# Patient Record
Sex: MALE | Race: BLACK OR AFRICAN AMERICAN | NOT HISPANIC OR LATINO | Employment: OTHER | URBAN - METROPOLITAN AREA
[De-identification: names, ages, dates, MRNs, and addresses within clinical notes are randomized per-mention and may not be internally consistent; named-entity substitution may affect disease eponyms.]

---

## 2019-07-08 LAB
ALBUMIN: 3.6 G/DL (ref 3.4–5)
ALP ISOS SERPL LEV INH-CCNC: 56 U/L (ref 46–116)
ALT (SGPT): 21 U/L (ref 16–63)
ANION GAP SERPL CALC-SCNC: 5 MMOL/L (ref 4–14)
AST SERPL-CCNC: 18 U/L (ref 15–37)
BILIRUB SERPL-MCNC: 0.7 MG/DL (ref 0.2–1)
BUN SERPL-MCNC: 15 MG/DL (ref 7–18)
BUN/CREAT RATIO: 17
CALCIUM SERPL-MCNC: 9.4 MG/DL (ref 8.5–10.1)
CHLORIDE SERPL-SCNC: 106 MMOL/L (ref 98–107)
CO2 SERPL-SCNC: 31 MMOL/L (ref 21–32)
CREAT SERPL-MCNC: 0.88 MG/DL (ref 0.7–1.3)
ERYTHROCYTE [DISTWIDTH] IN BLOOD BY AUTOMATED COUNT: 13.9 % (ref 12.3–17)
GFR MDRD AF AMER: >60 ML/MIN
GFR MDRD NON AF AMER: >60 ML/MIN
GLUCOSE: 110 MG/DL (ref 74–106)
HCT VFR BLD AUTO: 40.3 % (ref 36.7–47.1)
HGB BLD-MCNC: 13.2 G/DL (ref 12.5–16.3)
MAGNESIUM SERPL-MCNC: 1.9 MG/DL (ref 1.8–2.4)
MCH RBC QN AUTO: 29 PG (ref 24.3–33.2)
MCHC RBC AUTO-ENTMCNC: 32.8 G/DL (ref 29.6–33.8)
MCV RBC AUTO: 88.6 FL (ref 79.3–93.5)
PHOSPHATE FLD-MCNC: 3.6 MG/DL (ref 2.5–4.9)
PLATELET COUNT: 260 K/UL (ref 159–386)
PMV BLD AUTO: 9.4 FL (ref 9.1–12.7)
POTASSIUM: 4.1 MMOL/L (ref 3.5–5.1)
RBC # BLD AUTO: 4.55 M/UL (ref 4.06–5.63)
RDW-SD: 45.3 FL (ref 37.5–49)
SODIUM: 142 MMOL/L (ref 136–145)
TOTAL PROTEIN: 7 G/DL (ref 6.4–8.2)
TSH SERPL DL<=0.005 MIU/L-ACNC: 4.82 U[IU]/ML (ref 0.36–3.74)
WBC # BLD AUTO: 4.53 K/UL (ref 3.6–11.2)

## 2019-08-06 PROBLEM — E11.9 TYPE 2 DIABETES MELLITUS WITHOUT COMPLICATION, WITHOUT LONG-TERM CURRENT USE OF INSULIN: Status: ACTIVE | Noted: 2019-08-06

## 2019-08-06 PROBLEM — K21.9 GASTROESOPHAGEAL REFLUX DISEASE: Status: ACTIVE | Noted: 2019-08-06

## 2019-08-06 PROBLEM — N52.9 ERECTILE DYSFUNCTION: Status: ACTIVE | Noted: 2019-08-06

## 2019-08-06 PROBLEM — I10 ESSENTIAL HYPERTENSION: Status: ACTIVE | Noted: 2019-08-06

## 2020-01-17 PROBLEM — K11.7 XEROSTOMIA: Status: ACTIVE | Noted: 2018-10-12

## 2020-01-17 PROBLEM — Z92.3 S/P RADIATION THERAPY: Status: ACTIVE | Noted: 2018-10-12

## 2020-01-17 PROBLEM — R49.0 HOARSENESS OF VOICE: Status: ACTIVE | Noted: 2018-10-12

## 2020-08-29 PROBLEM — R06.1 STRIDOR: Status: ACTIVE | Noted: 2020-08-29

## 2020-08-30 PROBLEM — J96.02 ACUTE RESPIRATORY FAILURE WITH HYPOXIA AND HYPERCAPNIA: Status: ACTIVE | Noted: 2020-08-30

## 2020-08-30 PROBLEM — J96.01 ACUTE RESPIRATORY FAILURE WITH HYPOXIA AND HYPERCAPNIA: Status: ACTIVE | Noted: 2020-08-30

## 2020-09-08 PROBLEM — J95.09: Status: ACTIVE | Noted: 2020-09-08

## 2020-09-30 PROBLEM — C32.1 MALIGNANT NEOPLASM OF SUPRAGLOTTIS: Status: ACTIVE | Noted: 2020-09-30

## 2021-01-19 PROBLEM — J39.2 PHARYNGOCUTANEOUS FISTULA: Status: ACTIVE | Noted: 2021-01-19

## 2021-01-22 PROBLEM — L98.8 FISTULA: Status: ACTIVE | Noted: 2021-01-22

## 2021-01-29 PROBLEM — J95.09: Status: RESOLVED | Noted: 2020-09-08 | Resolved: 2021-01-29

## 2021-01-29 PROBLEM — E86.0 DEHYDRATION: Status: ACTIVE | Noted: 2021-01-29

## 2021-03-11 PROBLEM — R06.1 STRIDOR: Status: RESOLVED | Noted: 2020-08-29 | Resolved: 2021-03-11

## 2021-03-11 PROBLEM — J96.02 ACUTE RESPIRATORY FAILURE WITH HYPOXIA AND HYPERCAPNIA: Status: RESOLVED | Noted: 2020-08-30 | Resolved: 2021-03-11

## 2021-03-11 PROBLEM — J96.01 ACUTE RESPIRATORY FAILURE WITH HYPOXIA AND HYPERCAPNIA: Status: RESOLVED | Noted: 2020-08-30 | Resolved: 2021-03-11

## 2021-04-21 DIAGNOSIS — E11.9 TYPE 2 DIABETES MELLITUS WITHOUT COMPLICATION: ICD-10-CM

## 2021-09-17 PROBLEM — L98.8 FISTULA: Status: RESOLVED | Noted: 2021-01-22 | Resolved: 2021-09-17

## 2021-09-17 PROBLEM — J39.2 PHARYNGOCUTANEOUS FISTULA: Status: RESOLVED | Noted: 2021-01-19 | Resolved: 2021-09-17

## 2021-09-30 PROBLEM — Z85.21 HISTORY OF LARYNGEAL CANCER: Status: ACTIVE | Noted: 2021-09-30

## 2021-10-10 PROBLEM — R79.89 ABNORMAL TSH: Status: ACTIVE | Noted: 2021-10-10

## 2022-01-01 ENCOUNTER — NURSE TRIAGE (OUTPATIENT)
Dept: ADMINISTRATIVE | Facility: CLINIC | Age: 62
End: 2022-01-01
Payer: MEDICARE

## 2022-01-01 DIAGNOSIS — U07.1 COVID-19 VIRUS DETECTED: ICD-10-CM

## 2022-02-22 PROBLEM — T88.4XXA HARD TO INTUBATE: Status: ACTIVE | Noted: 2022-02-22

## 2022-04-05 PROBLEM — E87.6 HYPOKALEMIA: Status: ACTIVE | Noted: 2022-04-05

## 2022-06-21 PROBLEM — R13.19 ESOPHAGEAL DYSPHAGIA: Status: ACTIVE | Noted: 2022-01-01

## 2022-08-16 PROBLEM — M25.612 DECREASED ROM OF LEFT SHOULDER: Status: ACTIVE | Noted: 2022-01-01

## 2022-08-16 PROBLEM — M25.512 PAIN IN LEFT SHOULDER: Status: ACTIVE | Noted: 2022-01-01

## 2022-09-16 NOTE — TELEPHONE ENCOUNTER
Reason for Disposition   [1] COVID-19 infection suspected by caller or triager AND [2] mild symptoms (cough, fever, or others) AND [3] negative COVID-19 rapid test    Additional Information   Negative: SEVERE difficulty breathing (e.g., struggling for each breath, speaks in single words)   Negative: Difficult to awaken or acting confused (e.g., disoriented, slurred speech)   Negative: Bluish (or gray) lips or face now   Negative: Shock suspected (e.g., cold/pale/clammy skin, too weak to stand, low BP, rapid pulse)   Negative: Sounds like a life-threatening emergency to the triager   Negative: [1] Diagnosed or suspected COVID-19 AND [2] symptoms lasting 3 or more weeks   Negative: [1] COVID-19 exposure AND [2] NO symptoms   Negative: COVID-19 vaccine reaction suspected (e.g., fever, headache, muscle aches) occurring 1 to 3 days after getting vaccine   Negative: COVID-19 vaccine, questions about   Negative: [1] Lives with someone known to have influenza (flu test positive) AND [2] flu-like symptoms (e.g., cough, runny nose, sore throat, SOB; with or without fever)   Negative: [1] Adult with possible COVID-19 symptoms AND [2] triager concerned about severity of symptoms or other causes   Negative: COVID-19 and breastfeeding, questions about   Negative: SEVERE or constant chest pain or pressure  (Exception: Mild central chest pain, present only when coughing.)   Negative: MODERATE difficulty breathing (e.g., speaks in phrases, SOB even at rest, pulse 100-120)   Negative: MILD difficulty breathing (e.g., minimal/no SOB at rest, SOB with walking, pulse <100)   Negative: Headache and stiff neck (can't touch chin to chest)   Negative: Oxygen level (e.g., pulse oximetry) 90 percent or lower   Negative: Chest pain or pressure   Negative: Patient sounds very sick or weak to the triager   Negative: Fever > 103 F (39.4 C)   Negative: [1] Fever > 101 F (38.3 C) AND [2] over 60 years of age   Negative: HIGH RISK for severe COVID  complications (e.g., weak immune system, age > 64 years, obesity with BMI > 25, pregnant, chronic lung disease or other chronic medical condition) (Exception: Already seen by PCP and no new or worsening symptoms.)   Negative: [1] Fever > 100.0 F (37.8 C) AND [2] bedridden (e.g., nursing home patient, CVA, chronic illness, recovering from surgery)   Negative: [1] HIGH RISK patient AND [2] influenza is widespread in the community AND [3] ONE OR MORE respiratory symptoms: cough, sore throat, runny or stuffy nose   Negative: [1] HIGH RISK patient AND [2] influenza exposure within the last 7 days AND [3] ONE OR MORE respiratory symptoms: cough, sore throat, runny or stuffy nose   Negative: Oxygen level (e.g., pulse oximetry) 91 to 94 percent    Protocols used: Coronavirus (COVID-19) Diagnosed or [id number]  Pt stated he tested positive for COVID on 9/4/22. Stated he is trying to reach Ms Torres to report he is coughing up yellow sputum.    Pt stated he retested positive for COVID today at. Advised CDC does not recommend retesting and that the result can show positive for several weeks in some patients after initial diagnosis.     Pt answered no to all triage questions. Pt advised per triage protocol, and triage care advice. Instructed to call [facility] back for new or worsening symptoms. Advised a message will be sent to the Provider and office staff. Pt verbalized understanding.

## 2022-09-17 NOTE — TELEPHONE ENCOUNTER
Pt contacted for [facility] escalation - C/O weakness and mucous in throat. Denies any fever or SOB, pt able to speak in full sentences without noted SOB or cough. Declines from triage today but has general questions regarding ending home isolation. Covid 19 protocol used and pt advised on home care. Pt instructed to call [facility] for worsening of symptoms or health questions.   Reason for Disposition   COVID-19 Home Isolation, questions about    Protocols used: Coronavirus (COVID-19) - Diagnosed or [id number]

## 2022-10-10 PROBLEM — R53.1 WEAKNESS: Status: ACTIVE | Noted: 2022-01-01

## 2022-10-10 PROBLEM — E03.9 HYPOTHYROIDISM: Status: ACTIVE | Noted: 2022-01-01

## 2022-10-10 PROBLEM — N52.9 ERECTILE DYSFUNCTION: Status: RESOLVED | Noted: 2019-08-06 | Resolved: 2022-01-01

## 2022-10-10 PROBLEM — R62.7 FAILURE TO THRIVE IN ADULT: Status: ACTIVE | Noted: 2022-01-01

## 2022-10-10 PROBLEM — M86.9 OSTEOMYELITIS: Status: ACTIVE | Noted: 2022-01-01

## 2022-10-11 PROBLEM — R13.12 DYSPHAGIA, OROPHARYNGEAL: Status: ACTIVE | Noted: 2022-01-01

## 2022-10-11 PROBLEM — J96.11 CHRONIC RESPIRATORY FAILURE WITH HYPOXIA: Status: ACTIVE | Noted: 2022-01-01

## 2022-10-19 PROBLEM — Z79.899 ENCOUNTER FOR LONG-TERM CURRENT USE OF MEDICATION: Status: ACTIVE | Noted: 2022-01-01

## 2022-10-19 PROBLEM — Z00.00 PREVENTATIVE HEALTH CARE: Status: ACTIVE | Noted: 2022-01-01

## 2022-11-16 PROBLEM — J38.7 LARYNGEAL MASS: Status: ACTIVE | Noted: 2022-01-01

## 2023-01-13 PROBLEM — M75.90 BURSITIS AND TENDINITIS OF SHOULDER REGION: Status: ACTIVE | Noted: 2023-01-01

## 2023-01-13 PROBLEM — M75.50 BURSITIS AND TENDINITIS OF SHOULDER REGION: Status: ACTIVE | Noted: 2023-01-01

## 2023-01-16 PROBLEM — J96.11 CHRONIC RESPIRATORY FAILURE WITH HYPOXIA: Status: RESOLVED | Noted: 2022-01-01 | Resolved: 2023-01-01

## 2023-01-23 PROBLEM — Z00.00 ENCOUNTER FOR PREVENTIVE CARE: Status: RESOLVED | Noted: 2022-01-01 | Resolved: 2023-01-01

## 2023-02-13 PROBLEM — M40.202 CERVICAL KYPHOSIS: Status: ACTIVE | Noted: 2023-01-01

## 2023-05-07 PROBLEM — R06.02 SOB (SHORTNESS OF BREATH): Status: ACTIVE | Noted: 2023-01-01

## 2023-05-07 PROBLEM — I49.3 PVC (PREMATURE VENTRICULAR CONTRACTION): Status: ACTIVE | Noted: 2023-01-01

## 2023-05-07 PROBLEM — J90 PLEURAL EFFUSION: Status: ACTIVE | Noted: 2023-01-01

## 2023-05-08 PROBLEM — E87.5 HYPERKALEMIA: Status: ACTIVE | Noted: 2023-01-01

## 2023-05-08 PROBLEM — J96.01 ACUTE RESPIRATORY FAILURE WITH HYPOXIA AND HYPERCARBIA: Status: ACTIVE | Noted: 2023-01-01

## 2023-05-08 PROBLEM — J96.02 ACUTE RESPIRATORY FAILURE WITH HYPOXIA AND HYPERCARBIA: Status: ACTIVE | Noted: 2023-01-01

## 2023-05-08 PROBLEM — Z51.5 COMFORT MEASURES ONLY STATUS: Status: ACTIVE | Noted: 2023-01-01
